# Patient Record
Sex: FEMALE | Race: BLACK OR AFRICAN AMERICAN
[De-identification: names, ages, dates, MRNs, and addresses within clinical notes are randomized per-mention and may not be internally consistent; named-entity substitution may affect disease eponyms.]

---

## 2019-08-01 ENCOUNTER — HOSPITAL ENCOUNTER (EMERGENCY)
Dept: HOSPITAL 74 - JER | Age: 24
Discharge: HOME | End: 2019-08-01
Payer: SELF-PAY

## 2019-08-01 VITALS — DIASTOLIC BLOOD PRESSURE: 80 MMHG | TEMPERATURE: 98.1 F | HEART RATE: 87 BPM | SYSTOLIC BLOOD PRESSURE: 108 MMHG

## 2019-08-01 VITALS — BODY MASS INDEX: 19.8 KG/M2

## 2019-08-01 DIAGNOSIS — T38.6X5A: ICD-10-CM

## 2019-08-01 DIAGNOSIS — O26.891: Primary | ICD-10-CM
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
## 2019-08-01 DIAGNOSIS — Z3A.01: ICD-10-CM

## 2019-08-01 DIAGNOSIS — O9A.211: ICD-10-CM

## 2019-08-01 DIAGNOSIS — R11.2: ICD-10-CM

## 2019-08-01 LAB
ALBUMIN SERPL-MCNC: 3.7 G/DL (ref 3.4–5)
ALP SERPL-CCNC: 59 U/L (ref 45–117)
ALT SERPL-CCNC: 24 U/L (ref 13–61)
ANION GAP SERPL CALC-SCNC: 7 MMOL/L (ref 8–16)
APPEARANCE UR: (no result)
AST SERPL-CCNC: 17 U/L (ref 15–37)
BACTERIA # UR AUTO: 261.7 /HPF
BASOPHILS # BLD: 0.7 % (ref 0–2)
BILIRUB SERPL-MCNC: 0.4 MG/DL (ref 0.2–1)
BILIRUB UR STRIP.AUTO-MCNC: NEGATIVE MG/DL
BUN SERPL-MCNC: 5.6 MG/DL (ref 7–18)
CALCIUM SERPL-MCNC: 8.9 MG/DL (ref 8.5–10.1)
CASTS URNS QL MICRO: 55 /LPF (ref 0–8)
CHLORIDE SERPL-SCNC: 105 MMOL/L (ref 98–107)
CO2 SERPL-SCNC: 25 MMOL/L (ref 21–32)
COLOR UR: YELLOW
CREAT SERPL-MCNC: 0.7 MG/DL (ref 0.55–1.3)
CRYSTALS URNS QL MICRO: (no result) /HPF
DEPRECATED RDW RBC AUTO: 12.7 % (ref 11.6–15.6)
EOSINOPHIL # BLD: 0.2 % (ref 0–4.5)
EPITH CASTS URNS QL MICRO: 18.8 /HPF
GLUCOSE SERPL-MCNC: 115 MG/DL (ref 74–106)
HCT VFR BLD CALC: 37.4 % (ref 32.4–45.2)
HGB BLD-MCNC: 12.9 GM/DL (ref 10.7–15.3)
KETONES UR QL STRIP: (no result)
LEUKOCYTE ESTERASE UR QL STRIP.AUTO: (no result)
LYMPHOCYTES # BLD: 22.4 % (ref 8–40)
MAGNESIUM SERPL-MCNC: 2 MG/DL (ref 1.8–2.4)
MCH RBC QN AUTO: 32.6 PG (ref 25.7–33.7)
MCHC RBC AUTO-ENTMCNC: 34.6 G/DL (ref 32–36)
MCV RBC: 94.3 FL (ref 80–96)
MONOCYTES # BLD AUTO: 9.5 % (ref 3.8–10.2)
NEUTROPHILS # BLD: 67.2 % (ref 42.8–82.8)
NITRITE UR QL STRIP: NEGATIVE
PH UR: 6.5 [PH] (ref 5–8)
PLATELET # BLD AUTO: 199 K/MM3 (ref 134–434)
PMV BLD: 8.9 FL (ref 7.5–11.1)
POTASSIUM SERPLBLD-SCNC: 3.4 MMOL/L (ref 3.5–5.1)
PROT SERPL-MCNC: 7 G/DL (ref 6.4–8.2)
PROT UR QL STRIP: (no result)
PROT UR QL STRIP: NEGATIVE
RBC # BLD AUTO: 1 /HPF (ref 0–4)
RBC # BLD AUTO: 3.96 M/MM3 (ref 3.6–5.2)
SODIUM SERPL-SCNC: 137 MMOL/L (ref 136–145)
SP GR UR: 1.03 (ref 1.01–1.03)
UROBILINOGEN UR STRIP-MCNC: 1 MG/DL (ref 0.2–1)
WBC # BLD AUTO: 7.5 K/MM3 (ref 4–10)
WBC # UR AUTO: 26 /HPF (ref 0–5)

## 2019-08-01 PROCEDURE — 3E033NZ INTRODUCTION OF ANALGESICS, HYPNOTICS, SEDATIVES INTO PERIPHERAL VEIN, PERCUTANEOUS APPROACH: ICD-10-PCS | Performed by: EMERGENCY MEDICINE

## 2019-08-01 PROCEDURE — 3E033GC INTRODUCTION OF OTHER THERAPEUTIC SUBSTANCE INTO PERIPHERAL VEIN, PERCUTANEOUS APPROACH: ICD-10-PCS | Performed by: EMERGENCY MEDICINE

## 2019-08-01 NOTE — PDOC
History of Present Illness





- General


Chief Complaint: Nausea/Vomiting


Stated Complaint: VOMITING


Time Seen by Provider: 19 11:06


History Source: Patient


Exam Limitations: No Limitations





- History of Present Illness


Initial Comments: 


Pt is a 23 yo F,  (1 living child, 1 spontaneous , now at 6 wks 

pregnant), with PMH of anemia (no transfusions), epilepsy (last sz 1 year ago, 

not on AE), and asthma, who is presenting with nausea, vomiting, and diffuse 

abdominal cramping since yesterday afternoon after taking prescribed " 

pills" (1 dose of 200 mg mifepristone). Pt was going to [facility] ER for OB 

care, as she just moved to [place] from [place] recently. She has been 

admitted multiple times this pregnancy for hyperemesis. Pt states the n/v 

occurred all night, and she now has a mild headache. Pt denies any recent fevers

/chills, vision changes, syncope, chest pain, palpitations, SOB, urinary 

symptoms, diarrhea/constipation, or leg swelling.





Allergies: NKDA


PCP: None


LMP in March





Social: Pt denies any cigarette, alcohol, or drug use.


Pt denies any recent travel or sick contacts.


Surgical: no relevant history.


Family: no relevant history.


19 11:29











Past History





- Travel


Traveled outside of the country in the last 30 days: No


Close contact w/someone who was outside of country & ill: No





- Past Medical History


Allergies/Adverse Reactions: 


 Allergies











Allergy/AdvReac Type Severity Reaction Status Date / Time


 


morphine Allergy Mild  Verified 19 11:00











Home Medications: 


Ambulatory Orders





Ondansetron [Zofran Odt -] 4 mg SL TID PRN #20 od.tablet 19 








Anemia: Yes


Asthma: Yes


COPD: No


Seizures: Yes (not on meds)





- Suicide/Smoking/Psychosocial Hx


Smoking History: Never smoked


Information on smoking cessation initiated: No


Hx Alcohol Use: No


Drug/Substance Use Hx: No





Abd/GI Specific PMHX





- Complaint Specific PMHX


GERD: No





**Review of Systems





- Review of Systems


Able to Perform ROS?: Yes


Is the patient limited English proficient: No


Constitutional: Yes: Weight Stable.  No: Chills, Diaphoresis, Fever, Loss of 

Appetite, Malaise, Weakness


HEENTM: No: Blurred Vision, Double Vision, Nose Congestion, Throat Pain, Throat 

Swelling, Difficulty Swallowing


Respiratory: No: Cough, Orthopnea, Shortness of Breath, Wheezing


Cardiac (ROS): No: Chest Pain, Edema, Irregular Heart Rate, Lightheadedness, 

Palpitations, Syncope, Chest Tightness


ABD/GI: Yes: See HPI, Nausea, Poor Appetite, Poor Fluid Intake, Vomiting, 

Abdominal cramping.  No: Constipated, Diarrhea, Difficulty Swallowing, Rectal 

Bleeding, Indigestion


: No: Burning, Dysuria, Pain, Urgency


Musculoskeletal: No: Back Pain, Joint Pain


Integumentary: No: Rash


Neurological: Yes: Headache (post vomiting), Seizure (in the past, 1 per year, 

not on anti-epileptics).  No: Numbness, Paresthesia, Weakness, Unsteady Gait, 

Dizziness


Psychiatric: No: Sleep Pattern Change, Change in Appetite


Endocrine: No: Increased Urine, Change in Weight


Hematologic/Lymphatic: Yes: Anemia.  No: Blood Clots, Easy Bleeding, Easy 

Bruising





*Physical Exam





- Vital Signs


 Last Vital Signs











Temp Pulse Resp BP Pulse Ox


 


 98 F   80   19   135/88   98 


 


 19 10:58  19 10:58  19 10:58  19 10:58  19 10:58














- Physical Exam


Comments: 


Vitals stable, pt afebrile. Pt anxious appearing, dry-heaving on exam. Thin 

body habitus. 


Pt alert and oriented x3.


CNs generally intact, muscular strength and sensation intact. 


No midline spinal tenderness, step-offs, or crepitus. 


Head normocephalic, atraumatic.


Eyes PERRLA, EOMI. Oropharynx without erythema or exudates, no LAD b/l. 


No nasal congestion, hearing intact. 


Clear heart sounds, S1/S2, no JVD, b/l pedal edema, or heart murmur. 


Clear lung sounds, no respiratory distress, wheezes, crackles, or accessory 

muscle use. 


Diffuse abdominal TTP, worst in epigastric area. Abdomen soft, non-distended, 

and with normoactive bowel sounds. No CVA TTP.


Skin without jaundice or rash. 


19 11:27


19 11:32








ED Treatment Course





- LABORATORY


CBC & Chemistry Diagram: 


 19 11:09





 19 11:09





Medical Decision Making





- Medical Decision Making


Pt was seen at bedside, also will be seen by attending Dr. Interiano. Pt presenting 

with n/v and diffuse abdominal cramping, which is likely 2/2 to taking  

pills (mifepristone) yesterday. PE showed diffusely tender abdomen which was 

worst in the epigastric area. Will treat for n/v, possible hypovolemia and will 

evaluate for HELLP and anemia. 





Ordered work-up including CBC, CMP, Mg, beta-hcg, type and screen, UA.


Provided 1 L IV NS and 4 mg IV zofran for improvement of nausea and hypovolemia.


Will continue to reassess pt and monitor for symptomatic improvement.


19 11:33





Pt has had no further episodes of vomiting, states much more comfortable.


CBC and CMP WNL.


Pending type and screen (r/o need for rhogham)


19 12:07





Pt continues to complain of n/v -- providing reglan and benadryl.


19 12:11





Pt states n/v improved. Abdomen soft and non-tender.


CBC and CMP generally WNL. 


Providing antibiotics for UTI.


Considering normal lab results and imaging, pt can be discharged to home with 

follow-up. Pt advised to follow-up with PCP in 1-2 days and has been referred 

to OB and neurology. Strict return precautions provided with pt understanding.


19 13:18








*DC/Admit/Observation/Transfer


Diagnosis at time of Disposition: 


 Medical 





Nausea and vomiting


Qualifiers:


 Vomiting type: unspecified Vomiting Intractability: non-intractable Qualified 

Code(s): R11.2 - Nausea with vomiting, unspecified





Epilepsy


Qualifiers:


 Epilepsy type: unspecified Intractability: not intractable Status epilepticus: 

without status epilepticus Qualified Code(s): G40.909 - Epilepsy, unspecified, 

not intractable, without status epilepticus








- Discharge Dispostion


Disposition: HOME


Condition at time of disposition: Improved


Decision to Admit order: No





- Prescriptions


Prescriptions: 


Ondansetron [Zofran Odt -] 4 mg SL TID PRN #20 od.tablet


 PRN Reason: Nausea And/Or Vomiting





- Referrals


Referrals: 


Andrew Geronimo MD [Staff Physician] - 


Miguel Figueredo MD [Staff Physician] - 





- Patient Instructions


Printed Discharge Instructions:  DI for Therapeutic : Medical, DI for 

Hyperemesis Gravidarum


Additional Instructions: 


You were seen in the ER today for nausea and vomiting. The results of your labs 

and imaging today showed a UTI. Please follow-up with your primary care doctor, 

OB (Dr. Geronimo) and Neurology (Dr. Figueredo) within 1-2 days to discuss your 

visit and make sure your symptoms have improved. Please return to the ER if you 

have any worsening pain, development of fevers or chills, loss of consciousness

, inability to tolerate food or fluids, or any other concerns.





You should expect to pass tissue/clots and have some nausea and vomiting over 

the next few days. I have sent nausea medication and an antibiotic to your 

pharmacy. Please take these as prescribed.








- Post Discharge Activity

## 2019-08-01 NOTE — PDOC
Documentation entered by Wayne Guillen SCRIBE, acting as scribe for Rober Interiano MD.








Rober Interiano MD:  This documentation has been prepared by the Mindy zamora Elijah, SCRIBE, under my direction and personally reviewed by me in its 

entirety.  I confirm that the documentation accurately reflects all work, 

treatment, procedures, and medical decision making performed by me.  





Attending Attestation





- Resident


Resident Name: Jennie Johns





- ED Attending Attestation


I have performed the following: I have examined & evaluated the patient, The 

case was reviewed & discussed with the resident, I agree w/resident's findings 

& plan





- HPI


HPI: 





19 12:32


Patient is a 24 year old female   (6 wks pregnant), with a significant  

past medical history of anemia, epilepsy, and asthma who presents to the ED 

with nausea, vomiting and abdominal pain beginning yesterday. Patient recently 

was seen at [facility] where she recieved an  pill (Mifeprox). An hour 

after taking the pill, the vomiting began as well as abdominal pain which she 

described as cramping. 


Allergies: Morphine








- Physicial Exam


PE: 





19 12:32


Vitals: Triage Vital signs reviewed


General Appearance:  no acute distress, well nourished well developed,


Neck:  Supple;No Nuchal rigidity


Chest Wall: Nontender


Cardiac: Regular rate and rhythm, no murmurs, no rubs, no gallops,


Lungs: Clear to auscultation bilateral, good air movement bilaterally,


Abdomen:  Soft, nondistended, normal bowel sounds, nontender to palpation


Rectal: Exam deferred


Extremities: Full range of motion to all extremities, no cyanosis, clubbing, or 

edema


Skin:  Warm and dry, no rashes or lesions, no petechiae


Psych:  normal mood, normal affect








- Medical Decision Making





19 16:48





Nausea vomiting status post treatment with Mifeprox


6 weeks pregnant patient feels much better after antiemetics will follow-up 

with her OB/GYN this week





Findings, need for follow-up and strict return instructions discussed patient.